# Patient Record
Sex: MALE | Employment: UNEMPLOYED | URBAN - NONMETROPOLITAN AREA
[De-identification: names, ages, dates, MRNs, and addresses within clinical notes are randomized per-mention and may not be internally consistent; named-entity substitution may affect disease eponyms.]

---

## 2023-11-06 ENCOUNTER — APPOINTMENT (OUTPATIENT)
Dept: OCCUPATIONAL MEDICINE | Facility: OTHER | Age: 53
End: 2023-11-06

## 2023-11-06 PROCEDURE — 92499 UNLISTED OPH SVC/PROCEDURE: CPT

## 2023-11-06 PROCEDURE — 99499 UNLISTED E&M SERVICE: CPT

## 2023-11-06 PROCEDURE — 94010 BREATHING CAPACITY TEST: CPT

## 2023-11-06 PROCEDURE — 99000 SPECIMEN HANDLING OFFICE-LAB: CPT

## 2023-11-06 PROCEDURE — 99199 UNLISTED SPECIAL SVC PX/RPRT: CPT

## 2023-11-06 PROCEDURE — 99080 SPECIAL REPORTS OR FORMS: CPT

## 2023-11-06 PROCEDURE — 92552 PURE TONE AUDIOMETRY AIR: CPT

## 2023-11-07 ENCOUNTER — HOSPITAL ENCOUNTER (EMERGENCY)
Facility: HOSPITAL | Age: 53
Discharge: HOME | End: 2023-11-07
Attending: NURSE PRACTITIONER | Admitting: NURSE PRACTITIONER
Payer: OTHER MISCELLANEOUS

## 2023-11-07 ENCOUNTER — APPOINTMENT (OUTPATIENT)
Dept: GENERAL RADIOLOGY | Facility: HOSPITAL | Age: 53
End: 2023-11-07
Attending: NURSE PRACTITIONER
Payer: OTHER MISCELLANEOUS

## 2023-11-07 VITALS
OXYGEN SATURATION: 97 % | RESPIRATION RATE: 18 BRPM | HEART RATE: 89 BPM | SYSTOLIC BLOOD PRESSURE: 167 MMHG | TEMPERATURE: 97.6 F | DIASTOLIC BLOOD PRESSURE: 119 MMHG

## 2023-11-07 DIAGNOSIS — S90.32XA CONTUSION OF LEFT FOOT, INITIAL ENCOUNTER: ICD-10-CM

## 2023-11-07 PROBLEM — I63.9 CVA (CEREBROVASCULAR ACCIDENT) (H): Status: ACTIVE | Noted: 2019-04-30

## 2023-11-07 PROBLEM — I10 HYPERTENSION: Status: ACTIVE | Noted: 2021-02-11

## 2023-11-07 PROBLEM — G43.609: Status: ACTIVE | Noted: 2021-02-11

## 2023-11-07 PROBLEM — G81.91 HEMIPLEGIA AFFECTING RIGHT DOMINANT SIDE (H): Status: ACTIVE | Noted: 2021-02-11

## 2023-11-07 PROBLEM — I63.9: Status: ACTIVE | Noted: 2021-02-11

## 2023-11-07 PROCEDURE — 99283 EMERGENCY DEPT VISIT LOW MDM: CPT

## 2023-11-07 PROCEDURE — 99283 EMERGENCY DEPT VISIT LOW MDM: CPT | Performed by: NURSE PRACTITIONER

## 2023-11-07 PROCEDURE — 73630 X-RAY EXAM OF FOOT: CPT | Mod: LT

## 2023-11-07 ASSESSMENT — ENCOUNTER SYMPTOMS
ALLERGIC/IMMUNOLOGIC NEGATIVE: 1
CONSTITUTIONAL NEGATIVE: 1
EYES NEGATIVE: 1
ENDOCRINE NEGATIVE: 1
GASTROINTESTINAL NEGATIVE: 1
CARDIOVASCULAR NEGATIVE: 1
RESPIRATORY NEGATIVE: 1
NEUROLOGICAL NEGATIVE: 1
PSYCHIATRIC NEGATIVE: 1
HEMATOLOGIC/LYMPHATIC NEGATIVE: 1

## 2023-11-07 NOTE — Clinical Note
Salvatore Boyle was seen and treated in our emergency department on 11/7/2023.  He may return to work on 11/10/2023.       If you have any questions or concerns, please don't hesitate to call.      Juan Ramon Barakat, ANNITA CNP

## 2023-11-08 NOTE — ED PROVIDER NOTES
History     Chief Complaint   Patient presents with    Foot Injury     HPI  Salvatore Boyle is a 53 year old individual with history of hypertension, CVA with right-sided hemiplegia, comes in for left foot injury.  Patient states that a tailgate on a truck was frozen.  Did come down but then it landed on the top of his left foot.  Severe pain after this.  No other injury occurred.  Patient denies any paresthesias.  States pain with any weightbearing and movement of the foot.  Able to wiggle all the digits of the left foot.    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 Ongoing weakness right side          Past Medical History:    History reviewed. No pertinent past medical history.    Past Surgical History:    History reviewed. No pertinent surgical history.    Family History:    History reviewed. No pertinent family history.    Social History:  Marital Status:  Patient Declined [9]  Social History     Tobacco Use    Smoking status: Every Day     Types: Cigarettes, Cigars    Smokeless tobacco: Never    Tobacco comments:     3 cigars a day   Substance Use Topics    Alcohol use: Not Currently    Drug use: Yes     Types: Marijuana     Comment: daily        Medications:    No current outpatient medications on file.        Review of Systems   Constitutional: Negative.    HENT: Negative.     Eyes: Negative.    Respiratory: Negative.     Cardiovascular: Negative.    Gastrointestinal: Negative.    Endocrine: Negative.    Genitourinary: Negative.    Musculoskeletal:          Left foot pain.   Skin: Negative.    Allergic/Immunologic: Negative.    Neurological: Negative.    Hematological: Negative.    Psychiatric/Behavioral: Negative.         Physical Exam   BP: (!) 167/119  Pulse: 89  Temp: 97.6  F (36.4  C)  Resp: 18  SpO2: 97 %      GENERAL APPEARANCE:  The patient is a 53 year old well-developed, well-nourished individual that appears as stated age.  EXTREMITIES:  No cyanosis, clubbing, or edema.  Pedal and post-tibial pulses are 2+ to the left foot.  MUSCULOSKELETAL: Pain with any weightbearing to the left foot.  No crepitus or deformities.  Severe tenderness to palpation to the dorsum of the foot.  Range of motion of all digits intact of the left foot.  Range of motion of ankle intact on the left foot.   NEUROLOGIC:  No focal sensory or motor deficits are noted.   PSYCHIATRIC:  The patient is awake, alert, and oriented x4.  Recent and remote memory is intact.  Appropriate mood and affect.  Calm and cooperative with history and physical exam.  SKIN:  Warm, dry, and well perfused.  Good turgor.  No lesions, nodules, or rashes are noted.  No bruising noted.      Comment: Discrepancies between my note and notes on behalf of the nursing team or other care providers are secondary to my findings reflecting my physical examination and questioning of the patient.  Any conflicting information provided is not in line with my examination of the patient.       ED Course              ED Course as of 11/07/23 2242 Tue Nov 07, 2023 2222 X-rays ordered while patient in triage.   2242 No fracture noted on foot.  CMS intact.  Crutches given to patient.  Patient refuses any narcotics for pain control.  Will discharge on acetaminophen/ibuprofen.  Weightbearing as tolerated with crutches.  Cool compresses.  Follow-up with PCP for reevaluation.  Return if worsening.  Patient in agreement.                 No results found for this or any previous visit (from the past 24 hour(s)).    Left foot  x-ray:  No acute fracture.  2.   Hallux valgus with bunion    Medications - No data to display    Assessments & Plan (with Medical Decision Making)     I have reviewed the nursing notes.    I have reviewed the findings, diagnosis, plan and need for follow up with the patient.      Summary:  Patient presents to the ER today left foot injury.  Potential diagnosis which have been considered and evaluated include fracture, dislocation, contusion, compartment syndrome, as well as others. Many of these have been excluded using the various modalities and assessment as noted on the chart. At the present time, the diagnosis given seems to be the most likely left foot contusion.  Upon arrival, vitals signs show blood pressure 167/109 with a pulse of 89.  Temperature 36.4  C.  Respirations 18 oxygenation 97% on room air.  The patient is alert and oriented.  CMS intact to the left lower extremity.  Does have severe tenderness to palpation to the dorsum of the left foot.  No crepitus or deformities.  Pain with weightbearing.  X-ray personally reviewed showing no fracture or dislocation.  Patient has contusion to the left foot.  No swelling present at this time making compartment syndrome highly unlikely.  Patient defers any narcotic breakthrough pain killers.  We will discharge patient home with crutches which were given in the ER for weightbearing as tolerated.  Acetaminophen/ibuprofen for pain control.  Did give patient time off from work to keep foot elevated.  Follow-up with PCP for reevaluation.  Return to ER if any new or worsening symptoms.  Patient verbalized understanding agrees with plan of care.  Patient discharged home.        Critical Care Time: None    Impression and plan discussed with patient. Questions answered, concerns addressed, indications for urgent re-evaluation reviewed, and  given. Patient/Parent/Caregiver agree with treatment plan and have no further questions at this time.  AVS provided at  discharge.    This note was created by the Dragon Voice Dictation System. Inadvertent typographical errors, due to software recognition problems, may still exist.             New Prescriptions    No medications on file       Final diagnoses:   Contusion of left foot, initial encounter       11/7/2023   [facility]       Juan Ramon Barakat, ANNITA CNP  11/07/23 4791

## 2023-11-08 NOTE — ED TRIAGE NOTES
Patient presents with c/o left foot pain. Patient reports that about an hour ago a truck lift fell on his foot. Patient able to wiggle toes, denies any numbness or tingling, but does report pain.

## 2023-11-08 NOTE — DISCHARGE INSTRUCTIONS
Use crutches for weightbearing as tolerated.  Cool compresses for comfort.        Pain control:   If your past medical conditions, allergies, current medications, or current status does not prevent you from using acetaminophen and/or ibuprofen, use the following:   Acetaminophen 650-1000 mg every 6 hours as needed for pain in addition to ibuprofen 400-600 mg every 6 hours as needed for pain.  Take these two medications together if wanted.    Remember that these are for AS NEEDED.  If not needed, do not take.       Follow-up with your primary care provider for reevaluation.  Contact your primary care provider if you have any questions or concerns.  Do not hesitate to return to the ER if any new or worsening symptoms.     Please read the attached instructions (if any).  They highlight more specific treatments and interventions for you at home.              Thank you for letting me participate in your care and wish you a fast and uneventful recovery,    Juan Ramon ARIZA CNP    Do not hesitate to contact me with questions or concerns.  [email or web address]  [email or web address]

## 2023-11-28 ENCOUNTER — OFFICE VISIT (OUTPATIENT)
Dept: FAMILY MEDICINE | Facility: OTHER | Age: 53
End: 2023-11-28
Attending: CHIROPRACTOR
Payer: OTHER MISCELLANEOUS

## 2023-11-28 ENCOUNTER — HOSPITAL ENCOUNTER (EMERGENCY)
Facility: HOSPITAL | Age: 53
Discharge: HOME | End: 2023-11-28

## 2023-11-28 ENCOUNTER — HOSPITAL ENCOUNTER (EMERGENCY)
Facility: HOSPITAL | Age: 53
End: 2023-11-28

## 2023-11-28 VITALS
OXYGEN SATURATION: 97 % | TEMPERATURE: 98.7 F | RESPIRATION RATE: 19 BRPM | HEART RATE: 71 BPM | DIASTOLIC BLOOD PRESSURE: 92 MMHG | SYSTOLIC BLOOD PRESSURE: 158 MMHG

## 2023-11-28 VITALS
DIASTOLIC BLOOD PRESSURE: 103 MMHG | TEMPERATURE: 98.5 F | HEART RATE: 78 BPM | SYSTOLIC BLOOD PRESSURE: 158 MMHG | OXYGEN SATURATION: 97 % | WEIGHT: 211.56 LBS
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
## 2023-11-28 DIAGNOSIS — S90.30XA CONTUSION OF DORSUM OF FOOT: ICD-10-CM

## 2023-11-28 DIAGNOSIS — R36.9 PENILE DISCHARGE: ICD-10-CM

## 2023-11-28 LAB
ALBUMIN UR-MCNC: 20 MG/DL
APPEARANCE UR: CLEAR
BILIRUB UR QL STRIP: NEGATIVE
C TRACH DNA SPEC QL PROBE+SIG AMP: NEGATIVE
COLOR UR AUTO: ABNORMAL
GLUCOSE UR STRIP-MCNC: NEGATIVE MG/DL
HGB UR QL STRIP: ABNORMAL
KETONES UR STRIP-MCNC: NEGATIVE MG/DL
LEUKOCYTE ESTERASE UR QL STRIP: ABNORMAL
MUCOUS THREADS #/AREA URNS LPF: PRESENT /LPF
N GONORRHOEA DNA SPEC QL NAA+PROBE: POSITIVE
NITRATE UR QL: NEGATIVE
PH UR STRIP: 7.5 [PH] (ref 4.7–8)
RBC URINE: 8 /HPF
SP GR UR STRIP: 1.01 (ref 1–1.03)
SQUAMOUS EPITHELIAL: 0 /HPF
UROBILINOGEN UR STRIP-MCNC: NORMAL MG/DL
WBC URINE: 8 /HPF

## 2023-11-28 PROCEDURE — 96372 THER/PROPH/DIAG INJ SC/IM: CPT

## 2023-11-28 PROCEDURE — 250N000011 HC RX IP 250 OP 636: Mod: JZ

## 2023-11-28 PROCEDURE — 99213 OFFICE O/P EST LOW 20 MIN: CPT

## 2023-11-28 PROCEDURE — 87491 CHLMYD TRACH DNA AMP PROBE: CPT

## 2023-11-28 PROCEDURE — 81001 URINALYSIS AUTO W/SCOPE: CPT

## 2023-11-28 PROCEDURE — G0463 HOSPITAL OUTPT CLINIC VISIT: HCPCS | Mod: 25

## 2023-11-28 PROCEDURE — 99213 OFFICE O/P EST LOW 20 MIN: CPT | Performed by: CHIROPRACTOR

## 2023-11-28 PROCEDURE — 87591 N.GONORRHOEAE DNA AMP PROB: CPT

## 2023-11-28 RX ORDER — LOSARTAN POTASSIUM 100 MG/1
1 TABLET ORAL DAILY
COMMUNITY
Start: 2023-09-20

## 2023-11-28 RX ORDER — CLOPIDOGREL BISULFATE 75 MG/1
75 TABLET ORAL
COMMUNITY
Start: 2023-09-20 | End: 2023-12-27

## 2023-11-28 RX ORDER — CEFTRIAXONE SODIUM 1 G
500 VIAL (EA) INJECTION ONCE
Status: COMPLETED | OUTPATIENT
Start: 2023-11-28 | End: 2023-11-28

## 2023-11-28 RX ORDER — ATORVASTATIN CALCIUM 80 MG/1
1 TABLET, FILM COATED ORAL DAILY
COMMUNITY
Start: 2023-09-20

## 2023-11-28 RX ORDER — CETIRIZINE HYDROCHLORIDE 10 MG/1
1 TABLET ORAL DAILY
COMMUNITY
Start: 2023-09-20

## 2023-11-28 RX ADMIN — CEFTRIAXONE 500 MG: 500 INJECTION, POWDER, FOR SOLUTION INTRAMUSCULAR; INTRAVENOUS at 09:35

## 2023-11-28 ASSESSMENT — ENCOUNTER SYMPTOMS
VOMITING: 0
NAUSEA: 0
DIFFICULTY URINATING: 0
DIARRHEA: 0
ACTIVITY CHANGE: 0
APPETITE CHANGE: 0
DYSURIA: 0
FREQUENCY: 0
FEVER: 0
BACK PAIN: 0
CHILLS: 0
ABDOMINAL PAIN: 0
FLANK PAIN: 0
HEMATURIA: 0

## 2023-11-28 ASSESSMENT — PAIN SCALES - GENERAL: PAINLEVEL: MODERATE PAIN (5)

## 2023-11-28 ASSESSMENT — ACTIVITIES OF DAILY LIVING (ADL): ADLS_ACUITY_SCORE: 35

## 2023-11-28 NOTE — ED PROVIDER NOTES
History     Chief Complaint   Patient presents with    Penile Discharge     HPI  Salvatore Boyle is a 53 year old male who presents to the urgent care with a 2 day history of beige colored penile discharge. He denies fevers, chills, penile pain, testicular pain, dysuria, hematuria, pain with defecation, abd pain, and n/v/d. Denies any new partners or concern for STIs. No recent abx or allergies.     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 Ongoing weakness right side          Past Medical History:    No past medical history on file.    Past Surgical History:    No past surgical history on file.    Family History:    No family history on file.    Social History:  Marital Status:  Patient Declined [9]  Social History     Tobacco Use    Smoking status: Every Day     Types: Cigarettes, Cigars    Smokeless tobacco: Never    Tobacco comments:     3 cigars a day   Vaping Use    Vaping Use: Never used   Substance Use Topics    Alcohol use: Not Currently    Drug use: Yes     Types: Marijuana     Comment: daily        Medications:    atorvastatin (LIPITOR) 80 MG tablet  clopidogrel (PLAVIX) 75 MG tablet  losartan (COZAAR) 100 MG tablet  cetirizine (ZYRTEC) 10 MG tablet          Review of Systems   Constitutional:  Negative for activity change, appetite change, chills and fever.   Gastrointestinal:  Negative for abdominal pain, diarrhea, nausea and vomiting.   Genitourinary:  Positive for penile discharge. Negative for difficulty urinating, dysuria, flank pain,  frequency, genital sores, hematuria, penile pain, penile swelling, scrotal swelling and testicular pain.   Musculoskeletal:  Negative for back pain.   All other systems reviewed and are negative.      Physical Exam   Pulse: 71  Resp: 19  SpO2: 97 %      Physical Exam  Vitals and nursing note reviewed.   Constitutional:       General: He is not in acute distress.     Appearance: Normal appearance. He is normal weight. He is not ill-appearing or toxic-appearing.   Cardiovascular:      Rate and Rhythm: Normal rate and regular rhythm.      Pulses: Normal pulses.      Heart sounds: Normal heart sounds.   Pulmonary:      Effort: Pulmonary effort is normal. No respiratory distress.      Breath sounds: Normal breath sounds. No stridor. No wheezing, rhonchi or rales.   Chest:      Chest wall: No tenderness.   Abdominal:      General: Abdomen is flat. Bowel sounds are normal.      Palpations: Abdomen is soft.      Tenderness: There is no abdominal tenderness.   Genitourinary:     Penis: Normal. No erythema, tenderness, discharge, swelling or lesions.       Testes: Normal.         Right: Tenderness, testicular hydrocele or varicocele not present.         Left: Tenderness, testicular hydrocele or varicocele not present.      Epididymis:      Right: Not inflamed. No tenderness.      Left: Not inflamed. No tenderness.   Skin:     General: Skin is warm and dry.   Neurological:      Mental Status: He is alert.         ED Course                 Procedures           Results for orders placed or performed during the hospital encounter of 11/28/23 (from the past 24 hour(s))   UA with Microscopic reflex to Culture    Specimen: Urine, Clean Catch   Result Value Ref Range    Color Urine Light Yellow Colorless, Straw, Light Yellow, Yellow    Appearance Urine Clear Clear    Glucose Urine Negative Negative mg/dL    Bilirubin Urine Negative Negative    Ketones Urine Negative Negative mg/dL    Specific Gravity Urine 1.015 1.003 - 1.035     Blood Urine Moderate (A) Negative    pH Urine 7.5 4.7 - 8.0    Protein Albumin Urine 20 (A) Negative mg/dL    Urobilinogen Urine Normal Normal, 2.0 mg/dL    Nitrite Urine Negative Negative    Leukocyte Esterase Urine Small (A) Negative    Mucus Urine Present (A) None Seen /LPF    RBC Urine 8 (H) <=2 /HPF    WBC Urine 8 (H) <=5 /HPF    Squamous Epithelials Urine 0 <=1 /HPF    Narrative    Urine Culture not indicated       Medications   cefTRIAXone (ROCEPHIN) in lidocaine 1% (PF) for IM administration only 500 mg (has no administration in time range)       Assessments & Plan (with Medical Decision Making)     I have reviewed the nursing notes.    I have reviewed the findings, diagnosis, plan and need for follow up with the patient.  Salvatore Boyle is a 53 year old male who presents to the urgent care with a 2 day history of beige colored penile discharge. He denies fevers, chills, penile pain, testicular pain, dysuria, hematuria, pain with defecation, abd pain, and n/v/d. Denies any new partners or concern for STIs. No recent abx or allergies.     MDM: UA: negative for infection. Some microscopic hematuria.    GC: Pending.   VSS and afebrile. Lungs clear, heart tones regular. Bowel sounds active, no abd tenderness. There is no penile discharge on exam. No lesions to penis. No tenderness to penis or testicles. Rocephin IM given in UC as Salvatore does not wish to come back is positive for gonorrhea. Message sent to Veronica to assist with setting up PCP. Supportive measures and return precautions discussed. He is in agreement with plan.     (R36.9) Penile discharge  Plan: We will call with your results.   Veronica will be calling to assist you in setting up an appt with a new provider in the clinic.   Return with any fevers, abdominal pain, or other concerns. Understanding verbalized       New Prescriptions    No medications on file       Final diagnoses:   Penile discharge       11/28/2023   [facility]        Debra Morales, JENNIFER  11/28/23 0950

## 2023-11-28 NOTE — PROGRESS NOTES
CHIEF COMPLAINT:   Chief Complaint   Patient presents with    Work Comp       HISTORY OF PRESENTING WORK INJURY     Salvatore is a pleasant 53-year-old here for work-related injury follow-up.  date of injury is 11/7/2023.  At the time he was working for [employer] and a tailgate on a truck that was frozen in position came loose and landed directly on his left foot.  This caused abrasion and intense pain.  X-rays were obtained that day at the ER which indicated no fracture.  He was issued crutches and work restrictions.  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  His pain is still at 5/10 but he is able to bear full weight and indicates he is moving the foot is much as possible.  He has been working without restrictions for 2 weeks now and has no complaints of doing so.    PAST MEDICAL HISTORY:  No past medical history on file.    PAST SURGICAL HISTORY:  No past surgical history on file.    ALLERGIES:  No Known Allergies    CURRENT MEDICATIONS:  Current Outpatient Medications   Medication Sig Dispense Refill    atorvastatin (LIPITOR) 80 MG tablet Take 1 tablet by mouth daily      clopidogrel (PLAVIX) 75 MG tablet Take 75 mg by mouth      losartan (COZAAR) 100 MG tablet Take 1 tablet by mouth daily      cetirizine (ZYRTEC) 10 MG tablet Take 1 tablet by mouth daily (Patient not taking: Reported on 11/28/2023)         SOCIAL HISTORY:  Social History     Socioeconomic History    Marital status: Patient Declined     Spouse name: Not on file    Number of children: Not on file    Years of education: Not on file    Highest education level: Not on file   Occupational History    Not on file   Tobacco Use    Smoking status: Every Day     Types: Cigarettes, Cigars    Smokeless tobacco: Never    Tobacco comments:     3 cigars a day   Vaping Use    Vaping Use: Never used   Substance and Sexual Activity    Alcohol use: Not Currently    Drug use: Yes     Types: Marijuana      Comment: daily    Sexual activity: Not on file   Other Topics Concern    Not on file   Social History Narrative    Not on file     Social Determinants of Health     Financial Resource Strain: Low Risk  (11/28/2023)    Financial Resource Strain     Within the past 12 months, have you or your family members you live with been unable to get utilities (heat, electricity) when it was really needed?: No   Food Insecurity: Low Risk  (11/28/2023)    Food Insecurity     Within the past 12 months, did you worry that your food would run out before you got money to buy more?: No     Within the past 12 months, did the food you bought just not last and you didn t have money to get more?: No   Transportation Needs: Low Risk  (11/28/2023)    Transportation Needs     Within the past 12 months, has lack of transportation kept you from medical appointments, getting your medicines, non-medical meetings or appointments, work, or from getting things that you need?: No   Physical Activity: Not on file   Stress: Not on file   Social Connections: Not on file   Interpersonal Safety: Not on file   Housing Stability: Low Risk  (11/28/2023)    Housing Stability     Do you have housing? : Yes     Are you worried about losing your housing?: No       FAMILY HISTORY:  No family history on file.    REVIEW OF SYSTEMS:    Unremarkable other than chief complaint      PHYSICAL EXAM:   BP (!) 158/103 (BP Location: Right arm, Patient Position: Sitting, Cuff Size: Adult Regular)   Pulse 78   Temp 98.5  F (36.9  C) (Tympanic)   Wt 96 kg (211 lb 9 oz)   SpO2 97%  There is no height or weight on file to calculate BMI.    General Appearance: Pleasant, alert, appropriate appearance for age. No acute distress.    Patient is ambulatory without assistance and is able to move freely about the examination room.  He does not use a device for ambulation nor does he have crutches.  He is able to tolerate full weightbearing and demonstrates full motion of the  foot.  He is able to curl and extend his toes.  There is a normal healing scab at the dorsum of the foot with abrasion was, primarily located on the shaft of the first metatarsal.  No significant pain with palpation of the first metatarsal.  Full eversion and inversion motions are appreciated.    Reviewed most recent x-rays taken on November 7, 2023:      Study Result    Narrative & Impression   Exam: XR FOOT LEFT G/E 3 VIEWS      History:Male, age 53 years, Foot injury     Comparison:  No relevant prior imaging.     Technique: Three views are submitted.     Findings: Bones are normally mineralized. No evidence of acute or  subacute fracture.  No evidence of dislocation.                                                                           Impression:  No evidence of acute or subacute bony abnormality.      Hallux valgus.     This report is in agreement with the preliminary report.     BERYL BARRETT MD           IMPRESSION/PLAN:    He demonstrates normal healing contusion injury to the left foot.  He is able to bear full weight and I am not concerned with occult fracture.  His trauma was quite severe and may take another few weeks to fully heal.  Given this, we will follow-up with him in 1 month and consider additional imaging should he not have full or close to resolution of the injuries in question.  2 copies of the new workability were issued to him and he had no additional questions today.    Post encounter second BP check was performed by nurse.      Total time spent today in chart review/preparation, face to face evaluation, and documentation: 21 minutes.      Erik Catherine DC, JERRY, ANABELL  Director - Occupational Medicine Department  Diplomate of the American Board of Forensic Professionals  Board Certified - American Board of Independent Medical Examiners    8:25 AM 11/28/2023

## 2023-11-28 NOTE — NURSING NOTE
Patient reports to clinic for work comp injury, with a injury date of 11/07/2023.  Patient had a tailgate fall on his left foot, patient reports that he has been doing better since he was seen in the ER on 11/07/2023.

## 2023-11-28 NOTE — ED TRIAGE NOTES
Pt presents with c/o   States that he is having discharge  Denies having any itching, urinary issues,bleeding   Noticed it 2 days ago.

## 2023-11-28 NOTE — DISCHARGE INSTRUCTIONS
We will call with your results.   Veronica will be calling to assist you in setting up an appt with a new provider in the clinic.   Return with any fevers, abdominal pain, or other concerns.

## 2024-01-03 VITALS
SYSTOLIC BLOOD PRESSURE: 150 MMHG | TEMPERATURE: 97.9 F | RESPIRATION RATE: 18 BRPM | HEART RATE: 71 BPM | DIASTOLIC BLOOD PRESSURE: 84 MMHG | OXYGEN SATURATION: 71 %

## 2024-01-03 PROCEDURE — 99283 EMERGENCY DEPT VISIT LOW MDM: CPT

## 2024-01-03 PROCEDURE — 99283 EMERGENCY DEPT VISIT LOW MDM: CPT | Performed by: INTERNAL MEDICINE

## 2024-01-04 ENCOUNTER — HOSPITAL ENCOUNTER (EMERGENCY)
Facility: HOSPITAL | Age: 54
Discharge: HOME | End: 2024-01-04
Attending: INTERNAL MEDICINE | Admitting: INTERNAL MEDICINE

## 2024-01-04 DIAGNOSIS — H10.9 CONJUNCTIVITIS OF LEFT EYE, UNSPECIFIED CONJUNCTIVITIS TYPE: ICD-10-CM

## 2024-01-04 PROCEDURE — 250N000009 HC RX 250: Performed by: INTERNAL MEDICINE

## 2024-01-04 RX ORDER — TOBRAMYCIN 3 MG/ML
1 SOLUTION/ DROPS OPHTHALMIC EVERY 6 HOURS
Status: DISCONTINUED | OUTPATIENT
Start: 2024-01-04 | End: 2024-01-04 | Stop reason: HOSPADM

## 2024-01-04 RX ORDER — TOBRAMYCIN 3 MG/ML
1-2 SOLUTION/ DROPS OPHTHALMIC EVERY 6 HOURS
Qty: 5 ML | Refills: 0 | Status: SHIPPED | OUTPATIENT
Start: 2024-01-04

## 2024-01-04 RX ADMIN — TOBRAMYCIN 1 DROP: 3 SOLUTION/ DROPS OPHTHALMIC at 00:48

## 2024-01-04 NOTE — ED TRIAGE NOTES
"States that a couple days ago he developed left eye redness, itching and drainage. States he was also with someone that was diagnosed with pink eye the same that his symptoms started. He states that in the morning, his eye is \"crusted\".      Triage Assessment (Adult)       Row Name 01/03/24 1709          Triage Assessment    Airway WDL WDL                     "

## 2024-01-04 NOTE — LETTER
January 4, 2024      To Whom It May Concern:      Salvatore BRY Boyle was seen in our Emergency Department today, 01/04/24.  He may return to work 1/4/2024    Sincerely,        Paola Torres RN

## 2024-01-09 ASSESSMENT — ENCOUNTER SYMPTOMS
CHILLS: 0
FATIGUE: 0
ACTIVITY CHANGE: 0
NECK STIFFNESS: 0
EYE ITCHING: 1
DYSURIA: 0
MYALGIAS: 0
NAUSEA: 0
DIZZINESS: 0
EYE DISCHARGE: 1
SHORTNESS OF BREATH: 0
EYE PAIN: 1
BLURRED VISION: 0
DIARRHEA: 0
EYE REDNESS: 1
ARTHRALGIAS: 0
RHINORRHEA: 0
FEVER: 0
ABDOMINAL PAIN: 0
HEMATURIA: 0
COUGH: 0
VOMITING: 0
PERI-ORBITAL EDEMA: 0
APPETITE CHANGE: 0
HEADACHES: 0
SORE THROAT: 0

## 2024-01-09 ASSESSMENT — VISUAL ACUITY: OU: 1

## 2024-01-09 NOTE — ED PROVIDER NOTES
History     Chief Complaint   Patient presents with    Eye Problem     The history is provided by the patient.   Eye Problem  Location:  Left eye  Quality:  Tearing and stinging  Severity:  Moderate  Onset quality:  Gradual  Duration:  2 days  Progression:  Worsening  Chronicity:  New  Worsened by:  Nothing  Associated symptoms: discharge, itching and redness    Associated symptoms: no blurred vision, no headaches, no nausea, no swelling and no vomiting          Allergies:  No Known Allergies    Problem List:    Patient Active Problem List    Diagnosis Date Noted    Hypertension 02/11/2021     Priority: Medium    Hemiplegia affecting right dominant side (H) 02/11/2021     Priority: Medium    Persistent migraine aura with cerebral infarction and without status migrainosus, not intractable (H) 02/11/2021     Priority: Medium    CVA (cerebrovascular accident) (H) 04/30/2019     Priority: Medium     Formatting of this note might be different from the original. Ongoing weakness right side          Past Medical History:    No past medical history on file.    Past Surgical History:    No past surgical history on file.    Family History:    No family history on file.    Social History:  Marital Status:  Patient Declined [9]  Social History     Tobacco Use    Smoking status: Every Day     Types: Cigarettes, Cigars    Smokeless tobacco: Never    Tobacco comments:     3 cigars a day   Vaping Use    Vaping Use: Never used   Substance Use Topics    Alcohol use: Not Currently    Drug use: Yes     Types: Marijuana     Comment: daily        Medications:    atorvastatin (LIPITOR) 80 MG tablet  cetirizine (ZYRTEC) 10 MG tablet  losartan (COZAAR) 100 MG tablet  tobramycin (TOBREX) 0.3 % ophthalmic solution  clopidogrel (PLAVIX) 75 MG tablet          Review of Systems   Constitutional:  Negative for activity change, appetite change, chills, fatigue and fever.   HENT:  Negative for congestion, rhinorrhea and sore throat.    Eyes:   Positive for pain, discharge, redness and itching. Negative for blurred vision.   Respiratory:  Negative for cough and shortness of breath.    Cardiovascular:  Negative for chest pain.   Gastrointestinal:  Negative for abdominal pain, diarrhea, nausea and vomiting.   Genitourinary:  Negative for dysuria and hematuria.   Musculoskeletal:  Negative for arthralgias, myalgias and neck stiffness.   Skin:  Negative for rash.   Neurological:  Negative for dizziness and headaches.       Physical Exam   BP: 150/84  Pulse: 71  Temp: 97.9  F (36.6  C)  Resp: 18  SpO2: (!) 71 %      Physical Exam  Constitutional:       General: He is not in acute distress.     Appearance: Normal appearance. He is not toxic-appearing.   HENT:      Head: Atraumatic.   Eyes:      General: Vision grossly intact. No scleral icterus.        Left eye: No foreign body.      Extraocular Movements:      Left eye: Normal extraocular motion.      Conjunctiva/sclera: Conjunctivae normal.      Left eye: Exudate present.   Cardiovascular:      Rate and Rhythm: Normal rate.      Heart sounds: Normal heart sounds.   Pulmonary:      Effort: Pulmonary effort is normal. No respiratory distress.      Breath sounds: Normal breath sounds.   Abdominal:      Palpations: Abdomen is soft.      Tenderness: There is no abdominal tenderness.   Musculoskeletal:         General: No deformity.      Cervical back: Neck supple.   Skin:     General: Skin is warm.   Neurological:      Mental Status: He is alert.         ED Course                 Procedures           No results found for this or any previous visit (from the past 24 hour(s)).    Medications - No data to display    Assessments & Plan (with Medical Decision Making)   Acute left conjunctivitis   Tobramycin started  Follow-up with PCP/ eye clinic  I have reviewed the nursing notes.    I have reviewed the findings, diagnosis, plan and need for follow up with the patient.        Discharge Medication List as of 1/4/2024  12:53 AM        START taking these medications    Details   tobramycin (TOBREX) 0.3 % ophthalmic solution Place 1-2 drops into both eyes every 6 hours, Disp-5 mL, R-0, E-Prescribe             Final diagnoses:   Conjunctivitis of left eye, unspecified conjunctivitis type       1/3/2024   [facility]       Yovany Lala MD  01/09/24 0631

## 2024-04-16 ENCOUNTER — HOSPITAL ENCOUNTER (EMERGENCY)
Facility: HOSPITAL | Age: 54
Discharge: HOME | End: 2024-04-16
Attending: INTERNAL MEDICINE | Admitting: INTERNAL MEDICINE

## 2024-04-16 VITALS
TEMPERATURE: 96.2 F | WEIGHT: 225 LBS | SYSTOLIC BLOOD PRESSURE: 161 MMHG | HEART RATE: 84 BPM | DIASTOLIC BLOOD PRESSURE: 106 MMHG | RESPIRATION RATE: 18 BRPM | OXYGEN SATURATION: 93 %

## 2024-04-16 DIAGNOSIS — R36.9 PENILE DISCHARGE: ICD-10-CM

## 2024-04-16 PROCEDURE — 99284 EMERGENCY DEPT VISIT MOD MDM: CPT

## 2024-04-16 PROCEDURE — 250N000011 HC RX IP 250 OP 636: Performed by: INTERNAL MEDICINE

## 2024-04-16 PROCEDURE — 99283 EMERGENCY DEPT VISIT LOW MDM: CPT | Performed by: INTERNAL MEDICINE

## 2024-04-16 PROCEDURE — 96372 THER/PROPH/DIAG INJ SC/IM: CPT | Performed by: INTERNAL MEDICINE

## 2024-04-16 RX ORDER — CEFTRIAXONE SODIUM 1 G
500 VIAL (EA) INJECTION ONCE
Status: COMPLETED | OUTPATIENT
Start: 2024-04-16 | End: 2024-04-16

## 2024-04-16 RX ORDER — DOXYCYCLINE HYCLATE 100 MG
100 TABLET ORAL 2 TIMES DAILY
Qty: 20 TABLET | Refills: 0 | Status: SHIPPED | OUTPATIENT
Start: 2024-04-16 | End: 2024-04-26

## 2024-04-16 RX ADMIN — CEFTRIAXONE 500 MG: 500 INJECTION, POWDER, FOR SOLUTION INTRAMUSCULAR; INTRAVENOUS at 06:53

## 2024-04-16 ASSESSMENT — COLUMBIA-SUICIDE SEVERITY RATING SCALE - C-SSRS
2. HAVE YOU ACTUALLY HAD ANY THOUGHTS OF KILLING YOURSELF IN THE PAST MONTH?: NO
1. IN THE PAST MONTH, HAVE YOU WISHED YOU WERE DEAD OR WISHED YOU COULD GO TO SLEEP AND NOT WAKE UP?: NO
6. HAVE YOU EVER DONE ANYTHING, STARTED TO DO ANYTHING, OR PREPARED TO DO ANYTHING TO END YOUR LIFE?: NO

## 2024-04-16 ASSESSMENT — ACTIVITIES OF DAILY LIVING (ADL): ADLS_ACUITY_SCORE: 35

## 2024-04-16 NOTE — ED TRIAGE NOTES
Pt arrives with yellow discharge from penis since yesterday, denies any new sexual partners recently. Pt denies any c/o pain or burning with urination. Also c/o right ear pain.      Triage Assessment (Adult)       Row Name 04/16/24 0538          Triage Assessment    Airway WDL WDL        Respiratory WDL    Respiratory WDL WDL        Skin Circulation/Temperature WDL    Skin Circulation/Temperature WDL WDL        Cardiac WDL    Cardiac WDL WDL        Peripheral/Neurovascular WDL    Peripheral Neurovascular WDL WDL        Cognitive/Neuro/Behavioral WDL    Cognitive/Neuro/Behavioral WDL WDL

## 2024-04-16 NOTE — ED NOTES
States that yesterday he noticed that he had yellowish colored drainage from his penis. He states that it isn't draining a lot and he notices spots on his underwear. He denies pain, denies urinary symptoms. States he only has 1 sexual partner. He also states that his inner right ear started hurting a couple days ago. Denies drainage or injury.

## 2024-04-16 NOTE — ED NOTES
Discharge instructions gone over with patient and he states understanding. Discharged in stable condition, ambulatory.

## 2024-04-21 ASSESSMENT — ENCOUNTER SYMPTOMS
CHILLS: 0
DYSURIA: 0
HEMATURIA: 0
SHORTNESS OF BREATH: 0
FEVER: 0
ABDOMINAL PAIN: 0
COUGH: 0
FLANK PAIN: 0
FREQUENCY: 0

## 2024-04-22 NOTE — ED PROVIDER NOTES
History     Chief Complaint   Patient presents with    Penile Discharge    Otalgia       Male  Problem  Presenting symptoms: penile discharge    Presenting symptoms: no dysuria, no penile pain, no scrotal pain and no swelling    Relieved by:  Nothing  Worsened by:  Nothing  Associated symptoms: no abdominal pain, no fever, no flank pain, no hematuria, no penile swelling, no scrotal swelling and no urinary frequency          Allergies:  No Known Allergies    Problem List:    Patient Active Problem List    Diagnosis Date Noted    Hypertension 02/11/2021     Priority: Medium    Hemiplegia affecting right dominant side (H) 02/11/2021     Priority: Medium    Persistent migraine aura with cerebral infarction and without status migrainosus, not intractable (H) 02/11/2021     Priority: Medium    CVA (cerebrovascular accident) (H) 04/30/2019     Priority: Medium     Formatting of this note might be different from the original. Ongoing weakness right side          Past Medical History:    No past medical history on file.    Past Surgical History:    No past surgical history on file.    Family History:    No family history on file.    Social History:  Marital Status:  Patient Declined [9]  Social History     Tobacco Use    Smoking status: Every Day     Types: Cigarettes, Cigars    Smokeless tobacco: Never    Tobacco comments:     3 cigars a day   Vaping Use    Vaping status: Never Used   Substance Use Topics    Alcohol use: Not Currently    Drug use: Yes     Types: Marijuana     Comment: daily        Medications:    doxycycline hyclate (VIBRA-TABS) 100 MG tablet  atorvastatin (LIPITOR) 80 MG tablet  cetirizine (ZYRTEC) 10 MG tablet  clopidogrel (PLAVIX) 75 MG tablet  losartan (COZAAR) 100 MG tablet  tobramycin (TOBREX) 0.3 % ophthalmic solution          Review of Systems   Constitutional:  Negative for chills and fever.   Respiratory:  Negative for cough and shortness of breath.    Cardiovascular:  Negative for chest pain.    Gastrointestinal:  Negative for abdominal pain.   Genitourinary:  Positive for penile discharge. Negative for decreased urine volume, dysuria, flank pain, frequency, genital sores, hematuria, penile pain, penile swelling, scrotal swelling, testicular pain and urgency.   Skin:  Negative for rash.   All other systems reviewed and are negative.      Physical Exam   BP: (!) 161/106  Pulse: 84  Temp: (!) 96.2  F (35.7  C)  Resp: 18  Weight: 102.1 kg (225 lb)  SpO2: 93 %      Physical Exam  Vitals and nursing note reviewed.   Constitutional:       Appearance: He is well-developed.   HENT:      Head: Normocephalic and atraumatic.   Eyes:      Conjunctiva/sclera: Conjunctivae normal.      Pupils: Pupils are equal, round, and reactive to light.   Neck:      Thyroid: No thyromegaly.      Vascular: No JVD.      Trachea: No tracheal deviation.   Cardiovascular:      Rate and Rhythm: Normal rate and regular rhythm.      Heart sounds: Normal heart sounds. No murmur heard.     No gallop.   Pulmonary:      Effort: Pulmonary effort is normal. No respiratory distress.      Breath sounds: Normal breath sounds. No stridor. No wheezing or rales.   Chest:      Chest wall: No tenderness.   Abdominal:      General: Bowel sounds are normal. There is no distension.      Palpations: Abdomen is soft. There is no mass.      Tenderness: There is no abdominal tenderness. There is no guarding or rebound.   Musculoskeletal:         General: No tenderness. Normal range of motion.      Cervical back: Normal range of motion and neck supple.   Lymphadenopathy:      Cervical: No cervical adenopathy.   Skin:     General: Skin is warm.      Coloration: Skin is not pale.      Findings: No erythema or rash.   Neurological:      Mental Status: He is alert and oriented to person, place, and time.   Psychiatric:         Behavior: Behavior normal.         ED Course        Procedures                  No results found for this or any previous visit (from the  past 24 hour(s)).    Medications   cefTRIAXone (ROCEPHIN) in lidocaine 1% (PF) for IM administration only 500 mg (500 mg Intramuscular $Given 4/16/24 9355)       Assessments & Plan (with Medical Decision Making)   Painless penile discharge   Hx of chlamydia infection  Could not provide urine sample and just want to be treated  IM ceftriaxone given + doxy prescription  D  C home , follow-up with PCP  I have reviewed the nursing notes.    I have reviewed the findings, diagnosis, plan and need for follow up with the patient.          Discharge Medication List as of 4/16/2024  6:55 AM        START taking these medications    Details   doxycycline hyclate (VIBRA-TABS) 100 MG tablet Take 1 tablet (100 mg) by mouth 2 times daily for 10 days, Disp-20 tablet, R-0, E-Prescribe             Final diagnoses:   Penile discharge       4/16/2024   [facility]       Yovany Lala MD  04/21/24 5459

## 2024-05-17 ENCOUNTER — HOSPITAL ENCOUNTER (EMERGENCY)
Facility: HOSPITAL | Age: 54
Discharge: HOME | End: 2024-05-17
Attending: PHYSICIAN ASSISTANT | Admitting: PHYSICIAN ASSISTANT

## 2024-05-17 VITALS
SYSTOLIC BLOOD PRESSURE: 157 MMHG | RESPIRATION RATE: 16 BRPM | HEIGHT: 69 IN | WEIGHT: 219 LBS | DIASTOLIC BLOOD PRESSURE: 94 MMHG | TEMPERATURE: 97.1 F | OXYGEN SATURATION: 97 % | HEART RATE: 73 BPM | BODY MASS INDEX: 32.44 KG/M2

## 2024-05-17 DIAGNOSIS — K04.01 ACUTE PULPITIS: ICD-10-CM

## 2024-05-17 PROCEDURE — G0463 HOSPITAL OUTPT CLINIC VISIT: HCPCS

## 2024-05-17 PROCEDURE — 99213 OFFICE O/P EST LOW 20 MIN: CPT | Performed by: PHYSICIAN ASSISTANT

## 2024-05-17 ASSESSMENT — ACTIVITIES OF DAILY LIVING (ADL): ADLS_ACUITY_SCORE: 35

## 2024-05-17 NOTE — DISCHARGE INSTRUCTIONS
Please start the antibiotic for the tooth    -Doing salt water rinses can help this to heal as well. Mix 1/4 cup table salt in 1 cup of warm water. Hold to that area of your mouth as long as able, then spit out  -You may take ibuprofen 400-600 mg every 6-8 hours with food, as well as tylenol 500-1000 mg every 8 hours as needed    Numbing medication such as anbesol can help    Thank you!

## 2024-05-17 NOTE — ED PROVIDER NOTES
History     Chief Complaint   Patient presents with    Dental Pain     Left upper dental pain     HPI  Salvatore Boyle is a 53 year old male with PMH HTN, hemiplegia on R side, h/o CVA who presents to  with concerns of left upper dental pain. He states this began about two days ago. No fevers, no difficulty swallowing. He previously followed with a dentist but it has been quite some time. He has been taking OTC Medications without much relief. Wonders if he may need an antibiotic.     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 Ongoing weakness right side          Past Medical History:    History reviewed. No pertinent past medical history.    Past Surgical History:    History reviewed. No pertinent surgical history.    Family History:    History reviewed. No pertinent family history.    Social History:  Marital Status:  Patient Declined [9]  Social History     Tobacco Use    Smoking status: Every Day     Types: Cigarettes, Cigars    Smokeless tobacco: Never    Tobacco comments:     3 cigars a day   Vaping Use    Vaping status: Never Used   Substance Use Topics    Alcohol use: Not Currently    Drug use: Yes     Types: Marijuana     Comment: daily        Medications:    amoxicillin-clavulanate (AUGMENTIN) 875-125 MG tablet  atorvastatin (LIPITOR) 80 MG tablet  cetirizine (ZYRTEC) 10 MG tablet  losartan (COZAAR) 100 MG tablet  tobramycin (TOBREX) 0.3 % ophthalmic solution  clopidogrel (PLAVIX) 75 MG tablet          Review of Systems   All other systems  "reviewed and are negative.      Physical Exam   BP: 157/94  Pulse: 73  Temp: 97.1  F (36.2  C)  Resp: 16  Height: 175.3 cm (5' 9\")  Weight: 99.3 kg (219 lb)  SpO2: 97 %      Physical Exam  Vitals and nursing note reviewed.   Constitutional:       General: He is not in acute distress.     Appearance: Normal appearance. He is not ill-appearing, toxic-appearing or diaphoretic.   HENT:      Head: Normocephalic and atraumatic.      Right Ear: Tympanic membrane and ear canal normal.      Left Ear: Tympanic membrane and ear canal normal.      Mouth/Throat:     Cardiovascular:      Rate and Rhythm: Normal rate and regular rhythm.   Pulmonary:      Effort: Pulmonary effort is normal.      Breath sounds: Normal breath sounds.   Neurological:      General: No focal deficit present.      Mental Status: He is alert and oriented to person, place, and time.         ED Course        Procedures              No results found for this or any previous visit (from the past 24 hour(s)).    Medications - No data to display    Assessments & Plan (with Medical Decision Making)     I have reviewed the nursing notes.    I have reviewed the findings, diagnosis, plan and need for follow up with the patient.    Salvatore presented to  with concerns of left upper tooth pain. He was afebrile and no obvious abscess to drain. Given the atraumatic onset, will cover with Augmentin for acute pulpitis. We discussed other methods of pain control including ibuprofen, tylenol, topical numbing gel. He does plan to reach out to his dentist for prompt evaluation.       Discharge Medication List as of 5/17/2024 11:05 AM        START taking these medications    Details   amoxicillin-clavulanate (AUGMENTIN) 875-125 MG tablet Take 1 tablet by mouth 2 times daily for 7 days, Disp-14 tablet, R-0, E-Prescribe             Final diagnoses:   Acute pulpitis       5/17/2024   [facility]       Elvira Nicole PA-C  05/19/24 0716    "